# Patient Record
Sex: FEMALE | Race: WHITE | NOT HISPANIC OR LATINO | Employment: FULL TIME | URBAN - METROPOLITAN AREA
[De-identification: names, ages, dates, MRNs, and addresses within clinical notes are randomized per-mention and may not be internally consistent; named-entity substitution may affect disease eponyms.]

---

## 2022-11-20 ENCOUNTER — OFFICE VISIT (OUTPATIENT)
Dept: URGENT CARE | Facility: URGENT CARE | Age: 37
End: 2022-11-20
Payer: COMMERCIAL

## 2022-11-20 VITALS
TEMPERATURE: 99.1 F | WEIGHT: 202 LBS | HEART RATE: 98 BPM | DIASTOLIC BLOOD PRESSURE: 88 MMHG | OXYGEN SATURATION: 97 % | SYSTOLIC BLOOD PRESSURE: 150 MMHG

## 2022-11-20 DIAGNOSIS — J02.0 STREP THROAT: Primary | ICD-10-CM

## 2022-11-20 DIAGNOSIS — J06.9 UPPER RESPIRATORY TRACT INFECTION, UNSPECIFIED TYPE: ICD-10-CM

## 2022-11-20 LAB
DEPRECATED S PYO AG THROAT QL EIA: POSITIVE
FLUAV AG SPEC QL IA: NEGATIVE
FLUBV AG SPEC QL IA: NEGATIVE

## 2022-11-20 PROCEDURE — 87804 INFLUENZA ASSAY W/OPTIC: CPT | Performed by: EMERGENCY MEDICINE

## 2022-11-20 PROCEDURE — 87880 STREP A ASSAY W/OPTIC: CPT | Performed by: EMERGENCY MEDICINE

## 2022-11-20 PROCEDURE — 99203 OFFICE O/P NEW LOW 30 MIN: CPT | Performed by: EMERGENCY MEDICINE

## 2022-11-20 RX ORDER — AMOXICILLIN 500 MG/1
500 CAPSULE ORAL 2 TIMES DAILY
Qty: 20 CAPSULE | Refills: 0 | OUTPATIENT
Start: 2022-11-20 | End: 2022-11-20

## 2022-11-20 RX ORDER — AMOXICILLIN 500 MG/1
500 CAPSULE ORAL 2 TIMES DAILY
Qty: 20 CAPSULE | Refills: 0 | Status: SHIPPED | OUTPATIENT
Start: 2022-11-20

## 2022-11-20 NOTE — PROGRESS NOTES
Assessment & Plan     Diagnosis:    (J02.0) Strep throat  (primary encounter diagnosis)  Plan: amoxicillin (AMOXIL) 500 MG         Capsule    (J06.9) Upper respiratory tract infection, unspecified type    Medical Decision Making:  Yuli Fuentes is a 37 year old female who presents with sore throat and clinical evidence of pharyngitis.  The rapid strep test is positive. I see no clinical evidence of  peritonsillar abscess, retropharyngeal abscess, Lemierre's Syndrome, epiglottis, or Isaac's angina. The patient's symptoms are consistent with streptococcal pharyngitis.  Uvula midline.  Non-toxic appearing.  No drooling.  No stridor. I have recommended treatment with antibiotics and analgesics.  Go to the ER if increasing pain, change in voice, neck pain, vomiting, fever, or shortness of breath. Follow-up with primary physician if not improving in 3-5 days. Patient verbalized understanding and agreement with the plan.      REJ[facility] Pascual [facility]    Subjective     Yuli Fuentes is a 37 year old female who presents to clinic today for the following health issues:  Chief Complaint   Patient presents with     Pharyngitis       HPI  Onset of symptoms was 4-5 day(s) ago.  Course of illness is waxing and waning.  Fever has not been going away.  Severity moderate  Current and Associated symptoms: fever, runny nose, stuffy nose, ear pain, sore throat, hoarse voice, headache.  Denies cough - non-productive, wheezing, shortness of breath, ear drainage, vomiting, diarrhea, not eating   Taking in fluids?  Yes -- but hurts a bit.  Treatment measures tried include Tylenol/Ibuprofen  Predisposing factors include ill contact: Son is sick with similar symptoms.    No reported respiratory concerns, vomiting or difficulties swallowing food/fluids at this time. Patient is tolerating drinking liquids currently.        Review of Systems    See HPI    Objective      Vitals: BP (!) 150/88 (BP Location:  Right arm, Patient Position: Sitting, Cuff Size: Adult Regular)   Pulse 98   Temp 99.1  F (37.3  C) (Tympanic)   Wt 91.6 kg (202 lb)   SpO2 97%   Resp:     Patient Vitals for the past 24 hrs:   BP Temp Temp src Pulse SpO2 Weight   11/20/22 1426 (!) 150/88 99.1  F (37.3  C) Tympanic 98 97 % 91.6 kg (202 lb)       Vital signs reviewed by: Baldo Seymour PA-C    Physical Exam   Constitutional: Alert and active. Non-toxic appearing  No acute distress.  HENT:   Right Ear: External ear normal. TM: gray/pale appearing  Left Ear: External ear normal. TM: gray/pale appearing.  Nose: Nose normal.    Eyes: Conjunctivae, EOM and lids are normal.   Mouth: Mucous membranes are moist. Posterior oropharynx is erythematous. Exudates not present. Tonsils are slightly enlarged. Uvula is midline. No submandibular swelling or erythema.  Neck: Normal ROM. No meningismus.  No anterior or posterior chain cervical lymphadenopathy noted.  Cardiovascular: Regular rate and rhythm  Pulmonary/Chest: Effort normal. No respiratory distress. Lungs are clear to auscultation throughout.  GI: Abdomen is soft and non-tender throughout.   Musculoskeletal: Normal range of motion.   Neurological: Alert and appropriately oriented for age.   Skin: No rash noted on visualized skin.       Labs/Imaging:  Results for orders placed or performed in visit on 11/20/22   Streptococcus A Rapid Screen w/Reflex to PCR - Clinic Collect     Status: Abnormal    Specimen: Throat; Swab   Result Value Ref Range    Group A Strep antigen Positive (A) Negative   Influenza A & B Antigen - Clinic Collect     Status: Normal    Specimen: Nose; Swab   Result Value Ref Range    Influenza A antigen Negative Negative    Influenza B antigen Negative Negative    Narrative    Test results must be correlated with clinical data. If necessary, results should be confirmed by a molecular assay or viral culture.       Baldo Seymour PA-C, November 20, 2022

## 2022-11-21 RX ORDER — AMOXICILLIN 500 MG/1
500 CAPSULE ORAL 2 TIMES DAILY
Qty: 20 CAPSULE | Refills: 0 | Status: SHIPPED | OUTPATIENT
Start: 2022-11-21 | End: 2022-12-01